# Patient Record
Sex: MALE | Race: BLACK OR AFRICAN AMERICAN | NOT HISPANIC OR LATINO | URBAN - METROPOLITAN AREA
[De-identification: names, ages, dates, MRNs, and addresses within clinical notes are randomized per-mention and may not be internally consistent; named-entity substitution may affect disease eponyms.]

---

## 2017-04-12 ENCOUNTER — EMERGENCY (EMERGENCY)
Facility: HOSPITAL | Age: 27
LOS: 1 days | Discharge: HOME | End: 2017-04-12
Attending: EMERGENCY MEDICINE | Admitting: EMERGENCY MEDICINE
Payer: OTHER MISCELLANEOUS

## 2017-04-12 VITALS
HEART RATE: 84 BPM | OXYGEN SATURATION: 100 % | RESPIRATION RATE: 14 BRPM | TEMPERATURE: 98 F | SYSTOLIC BLOOD PRESSURE: 126 MMHG | DIASTOLIC BLOOD PRESSURE: 70 MMHG

## 2017-04-12 PROCEDURE — 99283 EMERGENCY DEPT VISIT LOW MDM: CPT

## 2017-04-12 RX ORDER — CYCLOBENZAPRINE HYDROCHLORIDE 10 MG/1
1 TABLET, FILM COATED ORAL
Qty: 15 | Refills: 0 | OUTPATIENT
Start: 2017-04-12

## 2017-04-12 RX ORDER — LIDOCAINE 4 G/100G
1 CREAM TOPICAL
Qty: 10 | Refills: 0 | OUTPATIENT
Start: 2017-04-12 | End: 2017-04-22

## 2017-04-12 RX ORDER — IBUPROFEN 200 MG
1 TABLET ORAL
Qty: 25 | Refills: 0 | OUTPATIENT
Start: 2017-04-12

## 2017-04-12 NOTE — ED ADULT TRIAGE NOTE - CHIEF COMPLAINT QUOTE
pt c/o of having back pain over one week states getting worse pt states he lifts heavy boxes at work. Pt states walking makes the pain worse.

## 2017-04-12 NOTE — ED PROVIDER NOTE - MUSCULOSKELETAL MINIMAL EXAM
muscle spasm on b/l mid lower back, no midline spinal tenderness, motor and sensation normal b/l lower extremities, normal gait

## 2017-04-12 NOTE — ED PROVIDER NOTE - OBJECTIVE STATEMENT
27 y/o M w/ no significant PMHx, presents to the ED c/o intermittent lower back pain x1 week. Pt notes pain radiates to mid back and neck intermittently. 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 Pt states pain usually occurs after work at night. Pt notes using Icy Hot w/ some relief. Denies leg pain/swelling, incontinence, numbness/tingling, weakness or any other complaints. NKDA.

## 2017-04-12 NOTE — ED PROVIDER NOTE - NS ED MD SCRIBE ATTENDING SCRIBE SECTIONS
HIV/DISPOSITION/VITAL SIGNS( Pullset)/REVIEW OF SYSTEMS/HISTORY OF PRESENT ILLNESS/PAST MEDICAL/SURGICAL/SOCIAL HISTORY/PHYSICAL EXAM

## 2017-04-12 NOTE — ED PROVIDER NOTE - MEDICAL DECISION MAKING DETAILS
27 y/o M c/o muscle spasm back pain due to pt's profession, no neuro deficits, no red flag back pain signs, will treat symptoms and dc home. Advised PMD follow up.

## 2018-07-18 ENCOUNTER — EMERGENCY (EMERGENCY)
Facility: HOSPITAL | Age: 28
LOS: 0 days | Discharge: HOME | End: 2018-07-18
Attending: EMERGENCY MEDICINE
Payer: OTHER MISCELLANEOUS

## 2018-07-18 VITALS
SYSTOLIC BLOOD PRESSURE: 116 MMHG | DIASTOLIC BLOOD PRESSURE: 73 MMHG | WEIGHT: 179.9 LBS | OXYGEN SATURATION: 99 % | TEMPERATURE: 99 F | HEIGHT: 76 IN | HEART RATE: 66 BPM | RESPIRATION RATE: 18 BRPM

## 2018-07-18 PROCEDURE — 99283 EMERGENCY DEPT VISIT LOW MDM: CPT

## 2018-07-18 PROCEDURE — 73562 X-RAY EXAM OF KNEE 3: CPT | Mod: 26,LT

## 2018-07-18 RX ORDER — ACETAMINOPHEN 500 MG
650 TABLET ORAL ONCE
Qty: 0 | Refills: 0 | Status: COMPLETED | OUTPATIENT
Start: 2018-07-18 | End: 2018-07-18

## 2018-07-18 RX ADMIN — Medication 650 MILLIGRAM(S): at 19:47
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
## 2018-07-18 NOTE — ED PROVIDER NOTE - PHYSICAL EXAMINATION
Gen: No acute distress, non toxic  HEENT: Mucous membranes moist, pink conjunctivae, EOMI NCAT  Neck: no midline ttp. supple  CV: RRR, nl s1/s2. no chest bruising  Resp: CTAB, normal rate and effort  GI: Abdomen soft, NT, ND. No rebound, no guarding  : No CVAT  Neuro: A&O x 3, moving all 4 extremities  MSK: No spine ttp. mild ttp over left patella. no laxity, no swelling. full rom. neurovascularly intact. no deformity  Skin: No rashes. intact and perfused.

## 2018-07-19 DIAGNOSIS — M25.562 PAIN IN LEFT KNEE: ICD-10-CM
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
## 2018-11-23 NOTE — ED ADULT NURSE NOTE - RELIEVING FACTORS
[facility]  [address] [place]  [address]  [phone]        November 27, 2018    Helena Eldridge  [address] [address]        Dear Melonie Malik are your results.  Your labs are normal/stable at this time.   The creatinine is a bit higher than previous, will continue to monitor.     Please call  with any questions.  We can also discuss any questions regarding these labs at your next scheduled visit.    Results for orders placed or performed in visit on 11/23/18   Basic metabolic panel   Result Value Ref Range    Sodium 139 133 - 144 mmol/L    Potassium 3.7 3.4 - 5.3 mmol/L    Chloride 104 94 - 109 mmol/L    Carbon Dioxide 29 20 - 32 mmol/L    Anion Gap 6 3 - 14 mmol/L    Glucose 144 (H) 70 - 99 mg/dL    Urea Nitrogen 18 7 - 30 mg/dL    Creatinine 1.56 (H) 0.52 - 1.04 mg/dL    GFR Estimate 32 (L) >60 mL/min/1.7m2    GFR Estimate If Black 38 (L) >60 mL/min/1.7m2    Calcium 8.8 8.5 - 10.1 mg/dL   TSH with free T4 reflex   Result Value Ref Range    TSH 1.44 0.40 - 4.00 mU/L       If you have any questions please call the clinic at [phone].    Sincerely,    Sushma ZAMBRANO    
none

## 2019-01-01 ENCOUNTER — EMERGENCY (EMERGENCY)
Facility: HOSPITAL | Age: 29
LOS: 0 days | Discharge: HOME | End: 2019-01-01
Attending: EMERGENCY MEDICINE
Payer: COMMERCIAL

## 2019-01-01 VITALS
SYSTOLIC BLOOD PRESSURE: 120 MMHG | TEMPERATURE: 99 F | WEIGHT: 179.9 LBS | OXYGEN SATURATION: 100 % | HEART RATE: 88 BPM | DIASTOLIC BLOOD PRESSURE: 73 MMHG | HEIGHT: 76 IN | RESPIRATION RATE: 17 BRPM

## 2019-01-01 DIAGNOSIS — S39.012A STRAIN OF MUSCLE, FASCIA AND TENDON OF LOWER BACK, INITIAL ENCOUNTER: ICD-10-CM
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
## 2019-01-01 DIAGNOSIS — M54.9 DORSALGIA, UNSPECIFIED: ICD-10-CM

## 2019-01-01 DIAGNOSIS — F12.10 CANNABIS ABUSE, UNCOMPLICATED: ICD-10-CM
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
## 2019-01-01 PROCEDURE — 99284 EMERGENCY DEPT VISIT MOD MDM: CPT

## 2019-01-01 PROCEDURE — 74176 CT ABD & PELVIS W/O CONTRAST: CPT | Mod: 26

## 2019-01-01 RX ORDER — METHOCARBAMOL 500 MG/1
1 TABLET, FILM COATED ORAL
Qty: 14 | Refills: 0 | OUTPATIENT
Start: 2019-01-01 | End: 2019-01-07

## 2019-01-01 NOTE — ED ADULT NURSE NOTE - NSIMPLEMENTINTERV_GEN_ALL_ED
Implemented All Universal Safety Interventions:  [place] to call system. Call bell, personal items and telephone within reach. Instruct patient to call for assistance. Room bathroom lighting operational. Non-slip footwear when patient is off stretcher. Physically safe environment: no spills, clutter or unnecessary equipment. Stretcher in lowest position, wheels locked, appropriate side rails in place.

## 2019-01-27 ENCOUNTER — EMERGENCY (EMERGENCY)
Facility: HOSPITAL | Age: 29
LOS: 0 days | Discharge: HOME | End: 2019-01-27
Attending: STUDENT IN AN ORGANIZED HEALTH CARE EDUCATION/TRAINING PROGRAM
Payer: COMMERCIAL

## 2019-01-27 VITALS
TEMPERATURE: 98 F | WEIGHT: 198.42 LBS | HEART RATE: 65 BPM | SYSTOLIC BLOOD PRESSURE: 130 MMHG | DIASTOLIC BLOOD PRESSURE: 63 MMHG | RESPIRATION RATE: 18 BRPM | HEIGHT: 76 IN | OXYGEN SATURATION: 98 %

## 2019-01-27 DIAGNOSIS — M25.552 PAIN IN LEFT HIP: ICD-10-CM

## 2019-01-27 DIAGNOSIS — Z79.1 LONG TERM (CURRENT) USE OF NON-STEROIDAL ANTI-INFLAMMATORIES (NSAID): ICD-10-CM

## 2019-01-27 PROCEDURE — 99282 EMERGENCY DEPT VISIT SF MDM: CPT

## 2019-01-27 NOTE — ED PROVIDER NOTE - CHPI ED SYMPTOMS NEG
no tingling/no deformity/no stiffness/no difficulty bearing weight/no numbness/no abrasion/no bruising

## 2019-01-27 NOTE — ED PROVIDER NOTE - OBJECTIVE STATEMENT
29 y/o male pt no pertinent PMHx presents today c/o hip pain x 1 month. Pt comes today requesting for a MRI to be done of L hip. Pt came to ED 2 weeks ago c/o of same sx after he fell down while playing basketball and injured his L hip. Pt had a CT done of the L hip 2 weeks ago which showed no abnormalities. Pt states he still has shooting pain in L hip which radiates down his L leg, takes Motrin currently for the pain with relief. Pt denies tingling, numbness, fevers, chills, dizziness, headache, n/v/d.

## 2019-01-27 NOTE — ED ADULT NURSE NOTE - NSIMPLEMENTINTERV_GEN_ALL_ED
Implemented All Universal Safety Interventions:  [place] to call system. Call bell, personal items and telephone within reach. Instruct patient to call for assistance. Room bathroom lighting operational. Non-slip footwear when patient is off stretcher. Physically safe environment: no spills, clutter or unnecessary equipment. Stretcher in lowest position, wheels locked, appropriate side rails in place.

## 2019-01-27 NOTE — ED PROVIDER NOTE - MEDICAL DECISION MAKING DETAILS
Pt to continue taking Motrin prn pain. Pt is urged to follow up with orthopedic doctor within next 2 weeks.

## 2019-01-27 NOTE — ED ADULT TRIAGE NOTE - CHIEF COMPLAINT QUOTE
pt stated hurt his left hip while playing basketball New Years and seen by MD.. TREATED AND RELEASED. Pt stated pain started today again

## 2019-02-13 ENCOUNTER — EMERGENCY (EMERGENCY)
Facility: HOSPITAL | Age: 29
LOS: 1 days | Discharge: HOME | End: 2019-02-13
Attending: EMERGENCY MEDICINE | Admitting: EMERGENCY MEDICINE
Payer: COMMERCIAL

## 2019-02-13 VITALS
HEART RATE: 69 BPM | RESPIRATION RATE: 18 BRPM | TEMPERATURE: 98 F | OXYGEN SATURATION: 99 % | SYSTOLIC BLOOD PRESSURE: 121 MMHG | DIASTOLIC BLOOD PRESSURE: 77 MMHG

## 2019-02-13 PROCEDURE — 99282 EMERGENCY DEPT VISIT SF MDM: CPT

## 2019-02-13 NOTE — ED PROVIDER NOTE - PHYSICAL EXAMINATION
Bony AYOUB MD PGY1:   PHYSICAL EXAM:    GENERAL: NAD, well-developed  HEENT:  Atraumatic, Normocephalic  CHEST/LUNG: Chest rise equal bilaterally  HEART: Regular rate and rhythm  ABDOMEN: Soft, Nontender, Nondistended  EXTREMITIES:  2+ Peripheral Pulses.  PSYCH: A&Ox3  SKIN: No obvious rashes or lesions  MSK: TTP of L quadriceps muscles without joint effusion or warmth. ROM intact, some pain with external rotation. Able to ambulate well.   NEUROLOGY: strength 5/5 assessed by hip knee and ankle in L leg. No gross changes in sensation in leg.

## 2019-02-13 NOTE — ED PROVIDER NOTE - CLINICAL SUMMARY MEDICAL DECISION MAKING FREE TEXT BOX
Bony AYOUB MD PGY1: 28 M PMH fall before new years with some L hip pain with prolonged ambulation. Able to ambulate here without difficulty. Unlikely avascular necrosis or fx given ability to ambulate without pain or difficulty, and only some medial thigh pain with external rotation of L leg. Unlikely fx given prior Xrays and patient not willing to do Xrays here. Recommend follow-up with sports medicine vs coordinate with insurance to find facility to cover MRI.

## 2019-02-13 NOTE — ED PROVIDER NOTE - NSFOLLOWUPCLINICS_GEN_ALL_ED_FT
[facility]  Sports Medicine  [address], [address]  [place] [address]  Phone: [phone]  Fax:     [facility]  Orthopedics  .  [place]   Phone: [phone]  Fax:   Follow Up Time:

## 2019-02-13 NOTE — ED ADULT TRIAGE NOTE - CHIEF COMPLAINT QUOTE
Pt c/o left hip pain x1 month. Pt states, "I had a cat scan and x-ray done that came back negative. I'm scheduled for a MRI but having difficulties because insurance only covers part of it." Denies recent injury/trauma.

## 2019-02-13 NOTE — ED PROVIDER NOTE - ATTENDING CONTRIBUTION TO CARE
agree with resident note  "28 M no PMH p/w persistent L hip pain since the week before new years and was told by a provider to come here for an MRI. "  xrays as outpatient have been normal.  Has been icing hamstring/quad with some relief.    PE: ambulating with no difficulty; VSS; CTAB/L; s1 s2 no m/r/g ext: left quad tenderness; FROM of quad, no erythema, warmth    Imp: ? quad tear; needs to get outpatient MRI

## 2019-02-13 NOTE — ED PROVIDER NOTE - OBJECTIVE STATEMENT
Bony AYOUB MD PGY1: 28 M no PMH p/w persistent L hip pain since the week before new years and was told by a provider to come here for an MRI. Patient has had Xrays that have been deemed normal. Has been taking over the counter medication for pain which has been helping but not eliminating the pain. Patient declining more xrays here.

## 2021-12-21 ENCOUNTER — TRANSCRIPTION ENCOUNTER (OUTPATIENT)
Age: 31
End: 2021-12-21

## 2024-03-01 NOTE — ED ADULT NURSE NOTE - RESPIRATORY WDL
Medication: metFORMIN (GLUCOPHAGE-XR) 500 MG 24 hr tablet  passed protocol.   Last office visit date: 04/12/2023  Next appointment scheduled?: Yes   Number of refills given: 1   Breathing spontaneous and unlabored. Breath sounds clear and equal bilaterally with regular rhythm.